# Patient Record
Sex: FEMALE | Race: WHITE | NOT HISPANIC OR LATINO | Employment: FULL TIME | URBAN - NONMETROPOLITAN AREA
[De-identification: names, ages, dates, MRNs, and addresses within clinical notes are randomized per-mention and may not be internally consistent; named-entity substitution may affect disease eponyms.]

---

## 2017-01-25 ENCOUNTER — OFFICE VISIT (OUTPATIENT)
Dept: FAMILY MEDICINE CLINIC | Facility: CLINIC | Age: 67
End: 2017-01-25

## 2017-01-25 VITALS
HEART RATE: 63 BPM | WEIGHT: 178 LBS | HEIGHT: 62 IN | OXYGEN SATURATION: 98 % | TEMPERATURE: 97.7 F | SYSTOLIC BLOOD PRESSURE: 110 MMHG | DIASTOLIC BLOOD PRESSURE: 78 MMHG | BODY MASS INDEX: 32.76 KG/M2

## 2017-01-25 DIAGNOSIS — J40 BRONCHITIS: Primary | ICD-10-CM

## 2017-01-25 PROCEDURE — 99213 OFFICE O/P EST LOW 20 MIN: CPT | Performed by: FAMILY MEDICINE

## 2017-01-25 NOTE — PROGRESS NOTES
Subjective   Janice Ahumada is a 66 y.o. female.     History of Present Illness   66-year-old female.  Chief complaint is cough.  Has had a recent cough congestion which was felt to be a viral syndrome.  Overall she is doing better.  Has not been exposed to flu.  Mild sore throat.  No ongoing fever.  The following portions of the patient's history were reviewed and updated as appropriate: allergies, current medications, past family history, past medical history, past social history, past surgical history and problem list.    Review of Systems   Constitutional: Negative for chills and fever.   HENT: Positive for congestion.    Eyes: Negative for discharge and itching.   Respiratory: Positive for cough. Negative for shortness of breath and wheezing.    Cardiovascular: Negative for chest pain.       Objective   Physical Exam   Constitutional: She appears well-developed and well-nourished.   HENT:   Right Ear: External ear normal.   Left Ear: External ear normal.   Mouth/Throat: Oropharynx is clear and moist.   Eyes: EOM are normal. Pupils are equal, round, and reactive to light.   Neck: Neck supple.   Cardiovascular: Normal rate, regular rhythm and normal heart sounds.    Pulmonary/Chest: Effort normal and breath sounds normal.   Vitals reviewed.  some pnd.    Assessment/Plan   Janice was seen today for sore throat and nasal congestion.    Diagnoses and all orders for this visit:    Bronchitis    Bronchitis is probably viral.  She is getting better daily and only has some cough (.  I doubt if this is flu.  After discussion we decided not to test.  She seems satisfied that her exam is normal.  Follow-up when necessary    Addendum.  Longtime smoker.  Has been trying to quit.  Has been referred to an oral surgeon where she's had a biopsy of her tongue.  No evidence of cancer.  She just wanted me to be updated.  Path reports on chart.

## 2017-01-25 NOTE — MR AVS SNAPSHOT
Janice Ahumada   1/25/2017 10:45 AM   Office Visit    Dept Phone:  [phone]   Encounter #:  [id number]    Provider:  Silvino Pmientel MD   Department:  [facility]                Your Full Care Plan              Your Updated Medication List          This list is accurate as of: 1/25/17 11:34 AM.  Always use your most recent med list.                estradiol 0.1 MG/GM vaginal cream   Commonly known as:  ESTRACE   pply cream per vagina twice a week or when necessary dryness/itching       * FLUoxetine 10 MG capsule   Commonly known as:  PROzac   Take 1 capsule by mouth Daily.       * FLUoxetine 20 MG capsule   Commonly known as:  PROzac   Take 1 capsule by mouth Daily.       fluticasone 50 MCG/ACT nasal spray   Commonly known as:  FLONASE   2 sprays into each nostril Daily.       levothyroxine 125 MCG tablet   Commonly known as:  SYNTHROID, LEVOTHROID   Take 1 tablet by mouth Daily.       * Notice:  This list has 2 medication(s) that are the same as other medications prescribed for you. Read the directions carefully, and ask your doctor or other care provider to review them with you.            You Were Diagnosed With        Codes Comments    Bronchitis    -  Primary ICD-10-CM: J40  ICD-9-CM: 490       Instructions     None    Patient Instructions History      Upcoming Appointments     Visit Type Date Time Department    OFFICE VISIT 1/25/2017 10:45 AM [facility]    PHYS[facility] 10/16/2017  9:15 AM [facility]     Our records indicate that you have declined [facility] signup. If you would like to sign up for [employer], please email [email or web address] or call [phone] to obtain an activation code.             Other Info from Your Visit           Your Appointments     Oct 16, 2017  9:15 AM EDT   Physical with Silvino Pimentel MD   [facility] (--)    [address]  [facility] [address] [phone]           Arrive 15 minutes prior to appointment.              Allergies     No Known Allergies      Reason for Visit     Sore Throat     Nasal Congestion           Vital Signs     Blood Pressure Pulse Temperature Height Weight Oxygen Saturation    110/78 (BP Location: Left arm, Patient Position: Sitting) 63 97.7 °F (36.5 °C) 62\" (157.5 cm) 178 lb (80.7 kg) 98%    Body Mass Index Smoking Status                32.56 kg/m2 Former Smoker          Problems and Diagnoses Noted     Bronchitis    -  Primary        "

## 2017-06-21 RX ORDER — NYSTATIN 100000 [USP'U]/G
POWDER TOPICAL
Qty: 15 G | Refills: 2 | Status: SHIPPED | OUTPATIENT
Start: 2017-06-21